# Patient Record
Sex: MALE | Race: OTHER | Employment: FULL TIME | URBAN - METROPOLITAN AREA
[De-identification: names, ages, dates, MRNs, and addresses within clinical notes are randomized per-mention and may not be internally consistent; named-entity substitution may affect disease eponyms.]

---

## 2021-05-09 ENCOUNTER — HOSPITAL ENCOUNTER (EMERGENCY)
Facility: HOSPITAL | Age: 51
Discharge: HOME | End: 2021-05-10
Attending: EMERGENCY MEDICINE

## 2021-05-09 ENCOUNTER — APPOINTMENT (OUTPATIENT)
Dept: GENERAL RADIOLOGY | Facility: HOSPITAL | Age: 51
End: 2021-05-09
Attending: EMERGENCY MEDICINE

## 2021-05-09 DIAGNOSIS — R11.2 NAUSEA AND VOMITING IN ADULT: Primary | ICD-10-CM

## 2021-05-09 DIAGNOSIS — T50.901A INGESTION OF UNKNOWN DRUG, ACCIDENTAL OR UNINTENTIONAL, INITIAL ENCOUNTER: ICD-10-CM

## 2021-05-09 DIAGNOSIS — R93.89 ABNORMAL CXR: ICD-10-CM

## 2021-05-09 PROCEDURE — 80053 COMPREHEN METABOLIC PANEL: CPT | Performed by: EMERGENCY MEDICINE

## 2021-05-09 PROCEDURE — 82077 ASSAY SPEC XCP UR&BREATH IA: CPT | Performed by: EMERGENCY MEDICINE

## 2021-05-09 PROCEDURE — 96375 TX/PRO/DX INJ NEW DRUG ADDON: CPT

## 2021-05-09 PROCEDURE — 96361 HYDRATE IV INFUSION ADD-ON: CPT

## 2021-05-09 PROCEDURE — 71045 X-RAY EXAM CHEST 1 VIEW: CPT | Performed by: EMERGENCY MEDICINE

## 2021-05-09 PROCEDURE — 85025 COMPLETE CBC W/AUTO DIFF WBC: CPT | Performed by: EMERGENCY MEDICINE

## 2021-05-09 PROCEDURE — 99284 EMERGENCY DEPT VISIT MOD MDM: CPT

## 2021-05-09 PROCEDURE — 96374 THER/PROPH/DIAG INJ IV PUSH: CPT

## 2021-05-09 RX ORDER — DIPHENHYDRAMINE HYDROCHLORIDE 50 MG/ML
25 INJECTION INTRAMUSCULAR; INTRAVENOUS ONCE
Status: COMPLETED | OUTPATIENT
Start: 2021-05-09 | End: 2021-05-09

## 2021-05-09 RX ORDER — ONDANSETRON 2 MG/ML
4 INJECTION INTRAMUSCULAR; INTRAVENOUS ONCE
Status: DISCONTINUED | OUTPATIENT
Start: 2021-05-09 | End: 2021-05-09

## 2021-05-09 RX ORDER — METOCLOPRAMIDE HYDROCHLORIDE 5 MG/ML
10 INJECTION INTRAMUSCULAR; INTRAVENOUS ONCE
Status: COMPLETED | OUTPATIENT
Start: 2021-05-09 | End: 2021-05-09

## 2021-05-09 RX ORDER — ONDANSETRON 2 MG/ML
4 INJECTION INTRAMUSCULAR; INTRAVENOUS ONCE
Status: COMPLETED | OUTPATIENT
Start: 2021-05-09 | End: 2021-05-09

## 2021-05-09 RX ORDER — ONDANSETRON 4 MG/1
4 TABLET, ORALLY DISINTEGRATING ORAL EVERY 8 HOURS PRN
Qty: 30 TABLET | Refills: 0 | Status: SHIPPED | OUTPATIENT
Start: 2021-05-09 | End: 2021-05-16

## 2021-05-09 RX ORDER — AMOXICILLIN AND CLAVULANATE POTASSIUM 875; 125 MG/1; MG/1
1 TABLET, FILM COATED ORAL 2 TIMES DAILY
Qty: 20 TABLET | Refills: 0 | Status: SHIPPED | OUTPATIENT
Start: 2021-05-09 | End: 2021-05-19

## 2021-05-10 VITALS
DIASTOLIC BLOOD PRESSURE: 74 MMHG | OXYGEN SATURATION: 96 % | WEIGHT: 140 LBS | BODY MASS INDEX: 24.8 KG/M2 | RESPIRATION RATE: 17 BRPM | HEIGHT: 63 IN | TEMPERATURE: 98 F | HEART RATE: 77 BPM | SYSTOLIC BLOOD PRESSURE: 117 MMHG

## 2021-05-10 NOTE — ED QUICK NOTES
Pt and pt's spouse provided and explained d/c instructions, at-home care, follow-up, and rx. Pt in nad at this time. Iv access d/c. Vss. Martinez. Ambulatory. A&ox3. Belongings with pt. All questions and concerns addressed.

## 2021-05-10 NOTE — ED INITIAL ASSESSMENT (HPI)
Ems called for opioid overdose. At scene, pt was apneic. One dose of IN narcan given by ems with no response. Piv access obtained and 4mg IVP narcan given with positive response. Pt arrival to ed, pt was actively vomiting. Per ems, pt endorses etoh use.

## 2021-05-10 NOTE — ED PROVIDER NOTES
Patient Seen in: [facility] Emergency Department      History   Patient presents with:  Overdose    Stated Complaint: OVERDOSE    HPI/Subjective:   HPI    53year old male otherwise healthy was brought by EMS with story of possible ingestion, apne Rhythm: Normal rate and regular rhythm. Heart sounds: Normal heart sounds. No murmur heard. Pulmonary:      Effort: Pulmonary effort is normal. No tachypnea or respiratory distress. Breath sounds: Normal breath sounds.  No decreased air moveme MDM      Pulse Ox: 98%, Normal, RA      Radiology findings:     CXR AP 2254 hours    Comparison: None      IMPRESSION:    - Streaky right basilar opacities could reflect atelectasis, infection or aspiration. Lungs otherwise clear.   Jackie Renee [id number]  [phone]    Schedule an appointment as soon as possible for a visit  Call for next available appointment, If you need a new primary care provider          Medications Prescribed:  Current Discharge Medication List    START taking these medicatio

## (undated) NOTE — LETTER
Date & Time: 5/9/2021, 11:49 PM  Patient: Malcolm Flank  Encounter Provider(s):    Juan Guthrie MD       To Whom It May Concern:    Mela Jane was seen and treated in our department on 5/9/2021. He should not return to work until Tuesday 5/11.     If